# Patient Record
Sex: FEMALE | Race: AMERICAN INDIAN OR ALASKA NATIVE
[De-identification: names, ages, dates, MRNs, and addresses within clinical notes are randomized per-mention and may not be internally consistent; named-entity substitution may affect disease eponyms.]

---

## 2017-03-17 ENCOUNTER — HOSPITAL ENCOUNTER (EMERGENCY)
Dept: HOSPITAL 5 - ED | Age: 52
Discharge: HOME | End: 2017-03-17
Payer: SELF-PAY

## 2017-03-17 VITALS — DIASTOLIC BLOOD PRESSURE: 84 MMHG | SYSTOLIC BLOOD PRESSURE: 122 MMHG

## 2017-03-17 DIAGNOSIS — M79.674: Primary | ICD-10-CM

## 2017-03-17 DIAGNOSIS — I10: ICD-10-CM

## 2017-03-17 LAB
ANION GAP SERPL CALC-SCNC: 16 MMOL/L
BASOPHILS NFR BLD AUTO: 1 % (ref 0–1.8)
BUN SERPL-MCNC: 7 MG/DL (ref 7–17)
BUN/CREAT SERPL: 14 %
CALCIUM SERPL-MCNC: 9.2 MG/DL (ref 8.4–10.2)
CHLORIDE SERPL-SCNC: 99.8 MMOL/L (ref 98–107)
CK SERPL-CCNC: 127 UNITS/L (ref 30–135)
CO2 SERPL-SCNC: 25 MMOL/L (ref 22–30)
EOSINOPHIL NFR BLD AUTO: 1 % (ref 0–4.3)
GLUCOSE SERPL-MCNC: 97 MG/DL (ref 65–100)
HCT VFR BLD CALC: 38.1 % (ref 30.3–42.9)
HGB BLD-MCNC: 12.6 GM/DL (ref 10.1–14.3)
MCH RBC QN AUTO: 27 PG (ref 28–32)
MCHC RBC AUTO-ENTMCNC: 33 % (ref 30–34)
MCV RBC AUTO: 81 FL (ref 79–97)
PLATELET # BLD: 303 K/MM3 (ref 140–440)
POTASSIUM SERPL-SCNC: 3.6 MMOL/L (ref 3.6–5)
RBC # BLD AUTO: 4.74 M/MM3 (ref 3.65–5.03)
SODIUM SERPL-SCNC: 137 MMOL/L (ref 137–145)
WBC # BLD AUTO: 3.2 K/MM3 (ref 4.5–11)

## 2017-03-17 PROCEDURE — 36415 COLL VENOUS BLD VENIPUNCTURE: CPT

## 2017-03-17 PROCEDURE — 70450 CT HEAD/BRAIN W/O DYE: CPT

## 2017-03-17 PROCEDURE — 85025 COMPLETE CBC W/AUTO DIFF WBC: CPT

## 2017-03-17 PROCEDURE — 84484 ASSAY OF TROPONIN QUANT: CPT

## 2017-03-17 PROCEDURE — 80048 BASIC METABOLIC PNL TOTAL CA: CPT

## 2017-03-17 PROCEDURE — 99284 EMERGENCY DEPT VISIT MOD MDM: CPT

## 2017-03-17 PROCEDURE — 93005 ELECTROCARDIOGRAM TRACING: CPT

## 2017-03-17 PROCEDURE — 93010 ELECTROCARDIOGRAM REPORT: CPT

## 2017-03-17 PROCEDURE — 82550 ASSAY OF CK (CPK): CPT

## 2017-03-17 NOTE — EMERGENCY DEPARTMENT REPORT
Entered by DILIA VILLA, acting as scribe for GERA SOLARES PA.





ED Lower Extremity HPI





- General


Chief Complaint: Extremity Injury, Lower


Stated Complaint: RT BIG TOE PAIN


Time Seen by Provider: 03/17/17 17:01


Source: patient


Mode of arrival: Ambulatory


Limitations: No Limitations





- History of Present Illness


Initial Comments: 


50 y/o female c/o right first toe pain beginning several days ago and worsening 

last night. Patient states that she has previous Hx of right first toe fracture

, and she "bumped" the affected toe on an end table several days ago. 

Associated symptoms of painful ROM of the right first toe and mild swelling, 

but she denies numbness and tingling. She is still able to ambulate. Noted the 

patient has a Hx of HTN but is non-compliant with medication. She admits to 

headache previously, but denies visual changes, chest pain, shortness of breath

, dizziness, confusion.  Denies fever, chills, nausea, vomiting, abdominal pain.


MD Complaint: other (TOE INJURY)


-: days(s) (several days ago), Last night (became worse)


Injury: Foot: Right (right first toe pain with movement)


Type of Injury: blunt


Place: home


Severity: moderate


Severity scale (0 -10): 6


Improves With: nothing


Worsens With: movement


Context: walking, other (patient "bumped" the affected toe on an end table, 

states she has a previous Hx of fracture in the right first toe)


Associated Symptoms: swelling, able to partially bear weight, ambulatory, other 

(painful ROM of the right first toe).  denies: numbness, tingling


Treatments Prior to Arrival: other (NONE)





- Related Data


 Home Medications











 Medication  Instructions  Recorded  Confirmed  Last Taken


 


amLODIPine [Norvasc]  12/23/13 12/23/13 Unknown








 Previous Rx's











 Medication  Instructions  Recorded  Last Taken  Type


 


Pantoprazole [Protonix TAB] 40 mg PO QDAY #30 tablet 05/27/14 Unknown Rx


 


Promethazine /Codeine 5 ml PO Q6H PRN #120 ml 05/27/14 Unknown Rx





[Phenergan/Codeine 6.25-10 mg/5 ml]    


 


Naproxen [Naprosyn] 500 mg PO BID #30 tablet 03/17/17 Unknown Rx


 


amLODIPine [Norvasc] 5 mg PO DAILY #30 tab 03/17/17 Unknown Rx











 Allergies











Allergy/AdvReac Type Severity Reaction Status Date / Time


 


lisinopril AdvReac  COUGH Verified 03/17/17 12:16














ED Review of Systems


Constitutional: denies: chills, fever, malaise


Eyes: denies: eye pain, vision change


ENT: denies: ear pain, throat pain, congestion


Respiratory: denies: cough, shortness of breath, wheezing


Cardiovascular: denies: chest pain, palpitations


Endocrine: no symptoms reported


Gastrointestinal: denies: abdominal pain, nausea, vomiting


Musculoskeletal: other (right first toe pain with movement, mild swelling of 

the right first toe)


Neurological: headache.  denies: weakness, numbness, paresthesias





ED Past Medical Hx





- Past Medical History


Hx Hypertension: Yes





- Surgical History


Additional Surgical History: c section X 2.  TUBAL LIGATION





- Social History


Smoking Status: Never Smoker


Substance Use Type: None





- Medications


Home Medications: 


 Home Medications











 Medication  Instructions  Recorded  Confirmed  Last Taken  Type


 


amLODIPine [Norvasc]  12/23/13 12/23/13 Unknown History


 


Pantoprazole [Protonix TAB] 40 mg PO QDAY #30 tablet 05/27/14  Unknown Rx


 


Promethazine /Codeine 5 ml PO Q6H PRN #120 ml 05/27/14  Unknown Rx





[Phenergan/Codeine 6.25-10 mg/5 ml]     


 


Naproxen [Naprosyn] 500 mg PO BID #30 tablet 03/17/17  Unknown Rx


 


amLODIPine [Norvasc] 5 mg PO DAILY #30 tab 03/17/17  Unknown Rx














ED Physical Exam





- General


Limitations: No Limitations


General appearance: alert, in no apparent distress





- Head


Head exam: Present: atraumatic, normocephalic





- Eye


Eye exam: Present: normal appearance





- ENT


ENT exam: Present: normal external ear exam





- Neck


Neck exam: Present: normal inspection, full ROM (supple)





- Respiratory


Respiratory exam: Present: normal lung sounds bilaterally.  Absent: respiratory 

distress, wheezes





- Cardiovascular


Cardiovascular Exam: Present: regular rate, normal rhythm, normal heart sounds.

  Absent: systolic murmur, diastolic murmur, rubs, gallop





- GI/Abdominal


GI/Abdominal exam: Present: soft, normal bowel sounds.  Absent: tenderness, 

guarding, rebound, rigid





- Extremities Exam


Extremities exam: Present: normal inspection (no obvious deformity or 

ecchymosis of the right first toe), full ROM (full but painful ROM of the right 

first toe), normal capillary refill.  Absent: tenderness





- Back Exam


Back exam: Present: normal inspection, full ROM





- Neurological Exam


Neurological exam: Present: alert, oriented X3





- Psychiatric


Psychiatric exam: Present: normal affect, normal mood





- Skin


Skin exam: Present: warm, dry, intact.  Absent: ecchymosis





ED Course


 Vital Signs











  03/17/17 03/17/17 03/17/17





  11:45 12:12 14:19


 


Temperature 98.7 F  97.2 F L


 


Pulse Rate 66 66 67


 


Respiratory 17  18





Rate   


 


Blood Pressure 224/118 224/118 125/89


 


O2 Sat by Pulse 100  100





Oximetry   














ED Lower Extremity MDM





- Lab Data


Result diagrams: 


 03/17/17 14:17





 03/17/17 14:17








 Vital Signs











  03/17/17 03/17/17 03/17/17





  11:45 12:12 14:19


 


Temperature 98.7 F  97.2 F L


 


Pulse Rate 66 66 67


 


Respiratory 17  18





Rate   


 


Blood Pressure 224/118 224/118 125/89


 


O2 Sat by Pulse 100  100





Oximetry   














 Lab Results











  03/17/17 03/17/17 Range/Units





  14:17 14:17 


 


WBC  3.2 L   (4.5-11.0)  K/mm3


 


RBC  4.74   (3.65-5.03)  M/mm3


 


Hgb  12.6   (10.1-14.3)  gm/dl


 


Hct  38.1   (30.3-42.9)  %


 


MCV  81   (79-97)  fl


 


MCH  27 L   (28-32)  pg


 


MCHC  33   (30-34)  %


 


RDW  13.6   (13.2-15.2)  %


 


Plt Count  303   (140-440)  K/mm3


 


Lymph % (Auto)  35.2 H   (13.4-35.0)  %


 


Mono % (Auto)  9.0 H   (0.0-7.3)  %


 


Eos % (Auto)  1.0   (0.0-4.3)  %


 


Baso % (Auto)  1.0   (0.0-1.8)  %


 


Lymph #  1.1 L   (1.2-5.4)  K/mm3


 


Mono #  0.3   (0.0-0.8)  K/mm3


 


Eos #  0.0   (0.0-0.4)  K/mm3


 


Baso #  0.0   (0.0-0.1)  K/mm3


 


Seg Neutrophils %  53.8   (40.0-70.0)  %


 


Seg Neutrophils #  1.7 L   (1.8-7.7)  K/mm3


 


Sodium   137  (137-145)  mmol/L


 


Potassium   3.6  (3.6-5.0)  mmol/L


 


Chloride   99.8  ()  mmol/L


 


Carbon Dioxide   25  (22-30)  mmol/L


 


Anion Gap   16  mmol/L


 


BUN   7  (7-17)  mg/dL


 


Creatinine   0.5 L  (0.7-1.2)  mg/dL


 


Estimated GFR   > 60  ml/min


 


BUN/Creatinine Ratio   14.00  %


 


Glucose   97  ()  mg/dL


 


Calcium   9.2  (8.4-10.2)  mg/dL


 


Total Creatine Kinase   127  ()  units/L


 


Troponin T   < 0.010  (0.00-0.029)  ng/mL














- Radiology Data


Radiology results: report reviewed, image reviewed





CT scan of head without contrast: 





Findings: 





Ventricles are normal in size and midline in location. No evidence of 


acute ischemia, hemorrhage or mass. No extra-axial fluid collection. 


Normal brainstem and cerebellum. 





Normal sinuses and mastoid air cells. 





Impression: 





No acute intracranial abnormality. 





---------------------------------------------------------------------





Right great toe 3 views: 





History: Right great toe injury. Pain. 





Findings: 





There is no fracture or dislocation noted at the distal phalanx of 


great toe and the proximal phalanx and the first metatarsal. 





Impression: 





No evidence of acute fracture. 








- Medical Decision Making


50 y/o female presents complaining of right first toe pain secondary to bumping 

the affected toe on an end table several days ago. Her x-ray reveals no 

fracture or dislocation.  Noted patient is non-compliant with HTN medication, 

admits to headache.  Her head CT reveals no acute process.  Patient was given 

clonidine 0.2 mg and denies headache post medication.  Patient is in no acute 

distress at this time. She will be discharged home and is encouraged to follow 

up with a primary care provider.  She will be sent home on Norvasc and naproxen 

and is encouraged to return to the emergency room for any worsening symptoms.





Emphasized the importance of follow up with  primary care physician and 

explained to patient that uncontrolled hypertension can lead to long-term 

complications of hypertension/elevated blood pressure including stroke, heart 

attack, disability, death, paralysis, permanent loss of quality of life.





ED Disposition


Clinical Impression: 


 Toe pain, right





HTN (hypertension)


Qualifiers:


 Hypertension type: essential hypertension Qualified Code(s): I10 - Essential (

primary) hypertension





Disposition: DISCHARGED TO HOME OR SELFCARE


Is pt being admited?: No


Does the pt Need Aspirin: No


Condition: Stable


Instructions:  Hypertension (ED), Arthralgia (ED)


Additional Instructions: 


Follow-up with primary care provider.  Return to the emergency department if 

symptoms worsen.


Prescriptions: 


amLODIPine [Norvasc] 5 mg PO DAILY #30 tab


Naproxen [Naprosyn] 500 mg PO BID #30 tablet


Referrals: 


PRIMARY CARE,MD [Primary Care Provider] - 3-5 Days


[facility] [Outside] - 3-5 Days


JUAN PABLO GAMBOA MD [Staff Physician] - 3-5 Days


Forms:  Work/School Release Form(ED)


Time of Disposition: 18:11





This documentation as recorded by the DAIS[employer] GRACE,accurately reflects the 

service I personally performed and the decisions made by me,GERA SOLARES PA.

## 2017-03-17 NOTE — XRAY REPORT
Right great toe 3 views:



History: Right great toe injury. Pain.



Findings:



There is no fracture or dislocation noted at the distal phalanx of 

great toe and the proximal phalanx and the first metatarsal.



Impression:



No evidence of acute fracture.

## 2017-03-17 NOTE — CAT SCAN REPORT
CT scan of head without contrast:



Findings:



Ventricles are normal in size and midline in location. No evidence of 

acute ischemia, hemorrhage or mass. No extra-axial fluid collection. 

Normal brainstem and cerebellum.



Normal sinuses and mastoid air cells.



Impression:



No acute intracranial abnormality.

## 2018-06-13 ENCOUNTER — HOSPITAL ENCOUNTER (EMERGENCY)
Dept: HOSPITAL 5 - ED | Age: 53
Discharge: LEFT BEFORE DISCHARGE | End: 2018-06-13
Payer: SELF-PAY

## 2018-06-13 VITALS — DIASTOLIC BLOOD PRESSURE: 99 MMHG | SYSTOLIC BLOOD PRESSURE: 213 MMHG

## 2018-06-13 DIAGNOSIS — M79.602: Primary | ICD-10-CM

## 2018-06-13 DIAGNOSIS — Z53.21: ICD-10-CM
